# Patient Record
Sex: FEMALE | Race: BLACK OR AFRICAN AMERICAN | NOT HISPANIC OR LATINO | URBAN - METROPOLITAN AREA
[De-identification: names, ages, dates, MRNs, and addresses within clinical notes are randomized per-mention and may not be internally consistent; named-entity substitution may affect disease eponyms.]

---

## 2023-09-28 ENCOUNTER — EMERGENCY (EMERGENCY)
Facility: HOSPITAL | Age: 48
LOS: 1 days | Discharge: HOME | End: 2023-09-28
Admitting: EMERGENCY MEDICINE
Payer: MEDICARE

## 2023-09-28 VITALS
TEMPERATURE: 99 F | DIASTOLIC BLOOD PRESSURE: 95 MMHG | HEART RATE: 80 BPM | SYSTOLIC BLOOD PRESSURE: 159 MMHG | RESPIRATION RATE: 16 BRPM | OXYGEN SATURATION: 99 %

## 2023-09-28 LAB
ALBUMIN SERPL ELPH-MCNC: 4.2 G/DL — SIGNIFICANT CHANGE UP (ref 3.3–5)
ALP SERPL-CCNC: 72 U/L — SIGNIFICANT CHANGE UP (ref 40–120)
ALT FLD-CCNC: 19 U/L — SIGNIFICANT CHANGE UP (ref 4–33)
ANION GAP SERPL CALC-SCNC: 9 MMOL/L — SIGNIFICANT CHANGE UP (ref 7–14)
AST SERPL-CCNC: 15 U/L — SIGNIFICANT CHANGE UP (ref 4–32)
B PERT DNA SPEC QL NAA+PROBE: SIGNIFICANT CHANGE UP
B PERT+PARAPERT DNA PNL SPEC NAA+PROBE: SIGNIFICANT CHANGE UP
BASOPHILS # BLD AUTO: 0.04 K/UL — SIGNIFICANT CHANGE UP (ref 0–0.2)
BASOPHILS NFR BLD AUTO: 0.6 % — SIGNIFICANT CHANGE UP (ref 0–2)
BILIRUB SERPL-MCNC: <0.2 MG/DL — SIGNIFICANT CHANGE UP (ref 0.2–1.2)
BORDETELLA PARAPERTUSSIS (RAPRVP): SIGNIFICANT CHANGE UP
BUN SERPL-MCNC: 13 MG/DL — SIGNIFICANT CHANGE UP (ref 7–23)
C PNEUM DNA SPEC QL NAA+PROBE: SIGNIFICANT CHANGE UP
CALCIUM SERPL-MCNC: 8.7 MG/DL — SIGNIFICANT CHANGE UP (ref 8.4–10.5)
CHLORIDE SERPL-SCNC: 104 MMOL/L — SIGNIFICANT CHANGE UP (ref 98–107)
CO2 SERPL-SCNC: 25 MMOL/L — SIGNIFICANT CHANGE UP (ref 22–31)
CREAT SERPL-MCNC: 0.76 MG/DL — SIGNIFICANT CHANGE UP (ref 0.5–1.3)
EGFR: 97 ML/MIN/1.73M2 — SIGNIFICANT CHANGE UP
EOSINOPHIL # BLD AUTO: 0.54 K/UL — HIGH (ref 0–0.5)
EOSINOPHIL NFR BLD AUTO: 8.5 % — HIGH (ref 0–6)
ERYTHROCYTE [SEDIMENTATION RATE] IN BLOOD: 12 MM/HR — SIGNIFICANT CHANGE UP (ref 4–25)
FLUAV SUBTYP SPEC NAA+PROBE: SIGNIFICANT CHANGE UP
FLUBV RNA SPEC QL NAA+PROBE: SIGNIFICANT CHANGE UP
GLUCOSE SERPL-MCNC: 105 MG/DL — HIGH (ref 70–99)
HADV DNA SPEC QL NAA+PROBE: SIGNIFICANT CHANGE UP
HCG SERPL-ACNC: <1 MIU/ML — SIGNIFICANT CHANGE UP
HCOV 229E RNA SPEC QL NAA+PROBE: SIGNIFICANT CHANGE UP
HCOV HKU1 RNA SPEC QL NAA+PROBE: SIGNIFICANT CHANGE UP
HCOV NL63 RNA SPEC QL NAA+PROBE: SIGNIFICANT CHANGE UP
HCOV OC43 RNA SPEC QL NAA+PROBE: SIGNIFICANT CHANGE UP
HCT VFR BLD CALC: 39.2 % — SIGNIFICANT CHANGE UP (ref 34.5–45)
HGB BLD-MCNC: 12.3 G/DL — SIGNIFICANT CHANGE UP (ref 11.5–15.5)
HMPV RNA SPEC QL NAA+PROBE: SIGNIFICANT CHANGE UP
HPIV1 RNA SPEC QL NAA+PROBE: SIGNIFICANT CHANGE UP
HPIV2 RNA SPEC QL NAA+PROBE: SIGNIFICANT CHANGE UP
HPIV3 RNA SPEC QL NAA+PROBE: SIGNIFICANT CHANGE UP
HPIV4 RNA SPEC QL NAA+PROBE: SIGNIFICANT CHANGE UP
IANC: 2.03 K/UL — SIGNIFICANT CHANGE UP (ref 1.8–7.4)
IMM GRANULOCYTES NFR BLD AUTO: 0.2 % — SIGNIFICANT CHANGE UP (ref 0–0.9)
LYMPHOCYTES # BLD AUTO: 3.35 K/UL — HIGH (ref 1–3.3)
LYMPHOCYTES # BLD AUTO: 52.8 % — HIGH (ref 13–44)
M PNEUMO DNA SPEC QL NAA+PROBE: SIGNIFICANT CHANGE UP
MCHC RBC-ENTMCNC: 25.8 PG — LOW (ref 27–34)
MCHC RBC-ENTMCNC: 31.4 GM/DL — LOW (ref 32–36)
MCV RBC AUTO: 82.4 FL — SIGNIFICANT CHANGE UP (ref 80–100)
MONOCYTES # BLD AUTO: 0.38 K/UL — SIGNIFICANT CHANGE UP (ref 0–0.9)
MONOCYTES NFR BLD AUTO: 6 % — SIGNIFICANT CHANGE UP (ref 2–14)
NEUTROPHILS # BLD AUTO: 2.03 K/UL — SIGNIFICANT CHANGE UP (ref 1.8–7.4)
NEUTROPHILS NFR BLD AUTO: 31.9 % — LOW (ref 43–77)
NRBC # BLD: 0 /100 WBCS — SIGNIFICANT CHANGE UP (ref 0–0)
NRBC # FLD: 0 K/UL — SIGNIFICANT CHANGE UP (ref 0–0)
PLATELET # BLD AUTO: 369 K/UL — SIGNIFICANT CHANGE UP (ref 150–400)
POTASSIUM SERPL-MCNC: 4.3 MMOL/L — SIGNIFICANT CHANGE UP (ref 3.5–5.3)
POTASSIUM SERPL-SCNC: 4.3 MMOL/L — SIGNIFICANT CHANGE UP (ref 3.5–5.3)
PROT SERPL-MCNC: 7.4 G/DL — SIGNIFICANT CHANGE UP (ref 6–8.3)
RAPID RVP RESULT: SIGNIFICANT CHANGE UP
RBC # BLD: 4.76 M/UL — SIGNIFICANT CHANGE UP (ref 3.8–5.2)
RBC # FLD: 12.2 % — SIGNIFICANT CHANGE UP (ref 10.3–14.5)
RSV RNA SPEC QL NAA+PROBE: SIGNIFICANT CHANGE UP
RV+EV RNA SPEC QL NAA+PROBE: SIGNIFICANT CHANGE UP
SARS-COV-2 RNA SPEC QL NAA+PROBE: SIGNIFICANT CHANGE UP
SODIUM SERPL-SCNC: 138 MMOL/L — SIGNIFICANT CHANGE UP (ref 135–145)
WBC # BLD: 6.35 K/UL — SIGNIFICANT CHANGE UP (ref 3.8–10.5)
WBC # FLD AUTO: 6.35 K/UL — SIGNIFICANT CHANGE UP (ref 3.8–10.5)

## 2023-09-28 PROCEDURE — 99285 EMERGENCY DEPT VISIT HI MDM: CPT

## 2023-09-28 RX ORDER — ACETAMINOPHEN 500 MG
1000 TABLET ORAL ONCE
Refills: 0 | Status: COMPLETED | OUTPATIENT
Start: 2023-09-28 | End: 2023-09-28

## 2023-09-28 RX ORDER — SODIUM CHLORIDE 9 MG/ML
1000 INJECTION INTRAMUSCULAR; INTRAVENOUS; SUBCUTANEOUS ONCE
Refills: 0 | Status: COMPLETED | OUTPATIENT
Start: 2023-09-28 | End: 2023-09-28

## 2023-09-28 RX ORDER — METOCLOPRAMIDE HCL 10 MG
10 TABLET ORAL ONCE
Refills: 0 | Status: COMPLETED | OUTPATIENT
Start: 2023-09-28 | End: 2023-09-28

## 2023-09-28 RX ADMIN — Medication 10 MILLIGRAM(S): at 20:31

## 2023-09-28 RX ADMIN — Medication 400 MILLIGRAM(S): at 20:32

## 2023-09-28 RX ADMIN — SODIUM CHLORIDE 1000 MILLILITER(S): 9 INJECTION INTRAMUSCULAR; INTRAVENOUS; SUBCUTANEOUS at 20:32

## 2023-09-28 NOTE — ED PROVIDER NOTE - CLINICAL SUMMARY MEDICAL DECISION MAKING FREE TEXT BOX
48-year-old female with history of hypertension presents complaining of headache x3 days.  Patient states that it is primarily located on the right side of her head and travels towards the side of her head and then back feeling like pressure, throbbing, and like a headband around her head all at the same time.  Patient does not recall what she was doing when it started but states that the headache became gradually worse each day unrelieved with over-the-counter medications.  Associated with neck pain and blurred vision at times.  Denies neck stiffness, fever, chills, cough, nausea, vomiting, chest pain, shortness of breath, weakness, paresthesias.  No recent travel.  Of note, patient has family history of brain aneurysm noting that her mother passed away from 1 that ruptured.  Patient denies tobacco use.  No other voiced complaints.  VSS. Exam as noted above. Will obtain labs (CBC, CMP, ESR, RVP) and CTA Head/Neck to assess for malignancy/infarct though low suspicion given H&P and will medicate with Reglan, IV Tylenol, and IVF. Dispo pending, follow progress notes.

## 2023-09-28 NOTE — ED ADULT NURSE NOTE - NSFALLUNIVINTERV_ED_ALL_ED
Bed/Stretcher in lowest position, wheels locked, appropriate side rails in place/Call bell, personal items and telephone in reach/Instruct patient to call for assistance before getting out of bed/chair/stretcher/Non-slip footwear applied when patient is off stretcher/[place] to call system/Physically safe environment - no spills, clutter or unnecessary equipment/Purposeful proactive rounding/Room/bathroom lighting operational, light cord in reach

## 2023-09-28 NOTE — ED PROVIDER NOTE - PHYSICAL EXAMINATION
CONSTITUTIONAL: Well-appearing; well-nourished; in no apparent distress. Non-toxic appearing.   NEURO: Alert, oriented x 3. Gait steady without assistance. No facial droop. Tongue protrudes midline. Strength to upper and lower extremities 5/5. Finger to nose smooth and accurate b/l.   PSYCH: Mood appropriate. Thought processes intact.   NECK: Supple  CARD: Regular rate and rhythm, no murmurs  RESP: No accessory muscle use; breath sounds clear and equal bilaterally; no wheezes, rhonchi, or rales     ABD: Soft; non-distended; non-tender. No guarding or rebound.   MUSCULOSKELETAL/EXTREMITIES: FROM in all four extremities; no extremity edema.  SKIN: Warm; dry; no apparent lesions or exudate

## 2023-09-28 NOTE — ED PROVIDER NOTE - NSFOLLOWUPINSTRUCTIONS_ED_ALL_ED_FT
Headache    A headache is pain or discomfort felt around the head or neck area. The specific cause of a headache may not be found as there are many types including tension headaches, migraine headaches, and cluster headaches. Watch your condition for any changes. Things you can do to manage your pain include taking over the counter and prescription medications as instructed by your health care provider, lying down in a dark quiet room, limiting stress, getting regular sleep, and refraining from alcohol and tobacco products.    SEEK IMMEDIATE MEDICAL CARE IF YOU HAVE ANY OF THE FOLLOWING SYMPTOMS: fever, vomiting, stiff neck, loss of vision, problems with speech, muscle weakness, loss of balance, trouble walking, passing out, or confusion. Follow up with Neurology as outpatient. You can follow up with Dr. Kalyan Hendricks or Dr. Bola Maloney after discharge. Please call [phone] to schedule an appointment within the next 1-2 weeks. Office is located at [address] [place] [address].    Headache    A headache is pain or discomfort felt around the head or neck area. The specific cause of a headache may not be found as there are many types including tension headaches, migraine headaches, and cluster headaches. Watch your condition for any changes. Things you can do to manage your pain include taking over the counter and prescription medications as instructed by your health care provider, lying down in a dark quiet room, limiting stress, getting regular sleep, and refraining from alcohol and tobacco products.    SEEK IMMEDIATE MEDICAL CARE IF YOU HAVE ANY OF THE FOLLOWING SYMPTOMS: fever, vomiting, stiff neck, loss of vision, problems with speech, muscle weakness, loss of balance, trouble walking, passing out, or confusion.

## 2023-09-28 NOTE — ED ADULT TRIAGE NOTE - CHIEF COMPLAINT QUOTE
pt c/o headache x 3 days. pt denies n/v/fever/chills. endorses sensitivity to light and sound. pain unrelieved with Aleve.  hx- asthma, HTN

## 2023-09-28 NOTE — ED PROVIDER NOTE - CARE PROVIDERS DIRECT ADDRESSES
[email or web address]

## 2023-09-28 NOTE — ED PROVIDER NOTE - DIFFERENTIAL DIAGNOSIS
SAH vs TIA/CVA vs migraine vs tension HA vs temporal arteritis vs viral syndrome Differential Diagnosis

## 2023-09-28 NOTE — ED ADULT NURSE NOTE - ISOLATION TYPE:
Discussed the importance of blood sugar control in the prevention of ocular complications. None 19-Mar-2020 21:00

## 2023-09-28 NOTE — ED PROVIDER NOTE - PATIENT PORTAL LINK FT
You can access the FollowMyHealth Patient Portal offered by [facility] by registering at the following website: [email or web address]. By joining [facility]’s FollowMyHealth portal, you will also be able to view your health information using other applications (apps) compatible with our system.

## 2023-09-28 NOTE — ED ADULT NURSE NOTE - OBJECTIVE STATEMENT
Pt received in intake 12. Pt alert and oriented x 4, ambulatory at baseline. Hx of HTN, asthma.  Pt c/o headache rated 7/10 that began three days ago and progressively became worse. Pt reports sensitivity to light and sound. Pt report blurry vision that began "months ago". Pt reports taking Aleve at ome with no relief. Respirations even and unlabored. Pt denies chest pain, shortness of breath, N/V/D/, dizziness, weakness, fever, chills,  symptoms. 20G IV in the left AC, labs drawn and pt medicated per MD orders.

## 2023-09-28 NOTE — ED PROVIDER NOTE - PROGRESS NOTE DETAILS
CARLA Martinez: Labs reviewed, non-actionable; RVP negative. pt made aware; states her HA is improving but not fully resolved. Awaiting CT scan. CARLA Martinez: CTs reviewed no acute pathology. Pt notes HA has returned; meds ordered and spoke with neurology who will be in to see patient. CARLA Martinez: Pt's HA resolved and patient feeling well. Will d/c to f/u as an outpatient; given strict return precautions. Dayday Gamez MD, PGY2  Pt signed out to me pending neuro recs after discussing with attending. Pt feeling better. Does not want to wait any longer for final neuro recs. Will dc home. Pt will follow up outpatient with neurology. Answered all questions and gave return precautions.

## 2023-09-28 NOTE — ED PROVIDER NOTE - OBJECTIVE STATEMENT
48-year-old female with history of hypertension presents complaining of headache x3 days.  Patient states that it is primarily located on the right side of her head and travels towards the side of her head and then back feeling like pressure, throbbing, and like a headband around her head all at the same time.  Patient does not recall what she was doing when it started but states that the headache became gradually worse each day unrelieved with over-the-counter medications.  Associated with neck pain and blurred vision at times.  Denies neck stiffness, fever, chills, cough, nausea, vomiting, chest pain, shortness of breath, weakness, paresthesias.  No recent travel.  Of note, patient has family history of brain aneurysm noting that her mother passed away from 1 that ruptured.  Patient denies tobacco use.  No other voiced complaints.

## 2023-09-28 NOTE — ED PROVIDER NOTE - CARE PROVIDER_API CALL
Elvira Lowe [place]  Neurology  [address] [place] [id number]  Phone: [phone]  Fax: [phone]  Follow Up Time:

## 2023-09-29 VITALS
OXYGEN SATURATION: 97 % | RESPIRATION RATE: 16 BRPM | HEART RATE: 72 BPM | DIASTOLIC BLOOD PRESSURE: 93 MMHG | SYSTOLIC BLOOD PRESSURE: 151 MMHG | TEMPERATURE: 98 F

## 2023-09-29 PROCEDURE — 70498 CT ANGIOGRAPHY NECK: CPT | Mod: 26,MA

## 2023-09-29 PROCEDURE — 70496 CT ANGIOGRAPHY HEAD: CPT | Mod: 26,MA

## 2023-09-29 RX ORDER — DEXAMETHASONE 0.5 MG/5ML
6 ELIXIR ORAL ONCE
Refills: 0 | Status: COMPLETED | OUTPATIENT
Start: 2023-09-29 | End: 2023-09-29

## 2023-09-29 RX ORDER — MAGNESIUM SULFATE 500 MG/ML
1 VIAL (ML) INJECTION ONCE
Refills: 0 | Status: COMPLETED | OUTPATIENT
Start: 2023-09-29 | End: 2023-09-29

## 2023-09-29 RX ORDER — METOCLOPRAMIDE HCL 10 MG
10 TABLET ORAL ONCE
Refills: 0 | Status: COMPLETED | OUTPATIENT
Start: 2023-09-29 | End: 2023-09-29

## 2023-09-29 RX ORDER — DIPHENHYDRAMINE HCL 50 MG
25 CAPSULE ORAL ONCE
Refills: 0 | Status: COMPLETED | OUTPATIENT
Start: 2023-09-29 | End: 2023-09-29

## 2023-09-29 RX ORDER — KETOROLAC TROMETHAMINE 30 MG/ML
15 SYRINGE (ML) INJECTION ONCE
Refills: 0 | Status: DISCONTINUED | OUTPATIENT
Start: 2023-09-29 | End: 2023-09-29

## 2023-09-29 RX ADMIN — Medication 25 MILLIGRAM(S): at 04:18

## 2023-09-29 RX ADMIN — Medication 10 MILLIGRAM(S): at 04:18

## 2023-09-29 RX ADMIN — Medication 300 GRAM(S): at 04:17

## 2023-09-29 RX ADMIN — Medication 15 MILLIGRAM(S): at 04:17

## 2023-09-29 RX ADMIN — Medication 6 MILLIGRAM(S): at 06:52

## 2023-09-29 NOTE — CONSULT NOTE ADULT - ASSESSMENT
Impression:        Recommendations:    [] Can trial migraine cocktail, please administer together:       1st line -  reglan 10 q6h, toradol 30 iv q8h, magnesium sulfate IV 1g, IV benadryl      2nd line: - solumedrol 250mg IV x1  [] Observe in ED until symptoms improve      Case to be seen and discussed with Neurology attending Dr. Lowe, please await final attending attestation.      TIFFANIE KAPOOR is a 48y (1975) woman with a PMHx significant for HTN who presents after three days of persistent headache.     Impression:  Gradual onset headache in R frontal region, throbbing/pounding quality associated with photophobia and phonophobia, visual floaters, likely primary headache, features consistent with migraine headaches. Less likely 2/2 intracranial mass, VST, or aneurysm     Recommendations:    [] Can trial migraine cocktail, please administer together:       1st line -  reglan 10 q6h, toradol 30 iv q8h, magnesium sulfate IV 1g, IV benadryl      2nd line: - solumedrol 250mg IV x1  [] Observe in ED until symptoms improve  [] Will defer MRI brain w/o for now, can be done outpatient   [] Patient can follow outpatient with Neurology at [address] [place] [address]. Please instruct patient to call [phone] to schedule an appointment.       Case to be seen and discussed with Neurology attending Dr. Lowe, please await final attending attestation.      TIFFANIE KAPOOR is a 48y (1975) woman with a PMHx significant for HTN who presents after three days of persistent headache.     Impression:  Gradual onset headache in R frontal region, throbbing/pounding quality associated with photophobia and phonophobia, visual floaters, likely primary headache, features consistent with migraine headaches. Less likely 2/2 intracranial mass, VST, or aneurysm     Recommendations:    [] Can trial migraine cocktail, please administer together:       1st line -  reglan 10 q6h, toradol 30 iv q8h, magnesium sulfate IV 1g, IV benadryl      2nd line: - solumedrol 250mg IV x1  [] Observe in ED until symptoms improve  [] Will defer MRI brain w/o for now, can be done outpatient   [] Can prescribe 2 week supply of NSAIDs, Reglan, Benadryl   [] Follow up with Neurology as outpatient. Patient can follow up with Dr. Kalyan Hendricks or Dr. Boal Maloney after discharge. Please instruct the patient/family to call [phone] to schedule an appointment within the next 1-2 weeks. Office is located at [address] [place] [address].      Case to be seen and discussed with Neurology attending Dr. Lowe, please await final attending attestation.

## 2023-09-29 NOTE — ED ADULT NURSE REASSESSMENT NOTE - NS ED NURSE REASSESS COMMENT FT1
Pt laying in bed, NAD, NSR on the monitor, respirations even and unlabored. Pt c/o headache rated 9/10, pt medicated per MD orders. Pt denies chest pain, shortness of breath, N/V/D, dizziness, weakness, lightheadedness, numbness, tingling, fever, chills.
Pt laying in bed, NAD, respirations even and unlabored, VS in flow sheet. Pt c/o headache rated 8/10, pt medicated per MD orders. Pt denies chest pain, shortness of breath, N/V/D weakness, fever, chills,  symptoms.
patient AOX4. feels better. denies any pain. d/c by MD. iv removed.

## 2023-09-29 NOTE — CONSULT NOTE ADULT - SUBJECTIVE AND OBJECTIVE BOX
Neurology - Consult Note    -  Spectra: [phone] ([facility]), [phone] ([facility])  -    HPI: Patient TIFFANIE KAPOOR is a 48y (1975) woman with a PMHx significant for     48-year-old female with history of hypertension presents complaining of headache x3 days.  Patient states that it is primarily located on the right side of her head and travels towards the side of her head and then back feeling like pressure, throbbing, and like a headband around her head all at the same time.  Patient does not recall what she was doing when it started but states that the headache became gradually worse each day unrelieved with over-the-counter medications.  Associated with neck pain and blurred vision at times.  Denies neck stiffness, fever, chills, cough, nausea, vomiting, chest pain, shortness of breath, weakness, paresthesias.  No recent travel.  Of note, patient has family history of brain aneurysm noting that her mother passed away from 1 that ruptured.  Patient denies tobacco use.  No other voiced complaints.      Review of Systems:  INCOMPLETE   CONSTITUTIONAL: No fevers or chills  EYES AND ENT: No visual changes or no throat pain   NECK: No pain or stiffness  RESPIRATORY: No hemoptysis or shortness of breath  CARDIOVASCULAR: No chest pain or palpitations  GASTROINTESTINAL: No melena or hematochezia  GENITOURINARY: No dysuria or hematuria  NEUROLOGICAL: +As stated in HPI above  SKIN: No itching, burning, rashes, or lesions   All other review of systems is negative unless indicated above.    Allergies:  No Known Allergies      PMHx/PSHx/Family Hx: As above, otherwise see below   HTN (hypertension)        Social Hx:  No current use of tobacco, alcohol, or illicit drugs  Lives with ***    Medications:  MEDICATIONS  (STANDING):  diphenhydrAMINE Injectable 25 milliGRAM(s) IV Push once  ketorolac   Injectable 15 milliGRAM(s) IV Push Once    MEDICATIONS  (PRN):      Vitals:  T(C): 37.1 (09-29-23 @ 02:20), Max: 37.1 (09-28-23 @ 19:04)  HR: 70 (09-29-23 @ 02:20) (70 - 80)  BP: 145/73 (09-29-23 @ 02:20) (145/73 - 159/95)  RR: 16 (09-29-23 @ 02:20) (16 - 16)  SpO2: 100% (09-29-23 @ 02:20) (99% - 100%)    Physical Examination: INCOMPLETE  General - NAD  Cardiovascular - Peripheral pulses palpable, no edema  Eyes - Fundoscopy with flat, sharp optic discs and no hemorrhage or exudates; Fundoscopy not well visualized; Fundoscopy not performed due to safety precautions in the setting of the COVID-19 pandemic    Neurologic Exam:  Mental status - Awake, Alert, Oriented to person, place, and time. Speech fluent, repetition and naming intact. Follows simple and complex commands. Attention/concentration, recent and remote memory (including registration and recall), and fund of knowledge intact    Cranial nerves - PERRLA, VFF, EOMI, face sensation (V1-V3) intact b/l, facial strength intact without asymmetry b/l, hearing intact b/l, palate with symmetric elevation, trapezius OR sternocleidomastiod 5/5 strength b/l, tongue midline on protrusion with full lateral movement    Motor - Normal bulk and tone throughout. No pronator drift.  Strength testing            Deltoid      Biceps      Triceps     Wrist Extension    Wrist Flexion     Interossei         R            5                 5               5                     5                              5                        5                 5  L             5                 5               5                     5                              5                        5                 5              Hip Flexion    Hip Extension    Knee Flexion    Knee Extension    Dorsiflexion    Plantar Flexion  R              5                           5                       5                           5                            5                          5  L              5                           5                        5                           5                            5                          5    Sensation - Light touch/temperature OR pain/vibration intact throughout    DTR's -             Biceps      Triceps     Brachioradialis      Patellar    Ankle    Toes/plantar response  R             2+             2+                  2+                       2+            2+                 Down  L              2+             2+                 2+                        2+           2+                 Down    Coordination - Finger to Nose intact b/l. No tremors appreciated    Gait and station - Normal casual gait. Romberg (-)    Labs:                        12.3   6.35  )-----------( 369      ( 28 Sep 2023 20:35 )             39.2     09-28    138  |  104  |  13  ----------------------------<  105<H>  4.3   |  25  |  0.76    Ca    8.7      28 Sep 2023 20:35    TPro  7.4  /  Alb  4.2  /  TBili  <0.2  /  DBili  x   /  AST  15  /  ALT  19  /  AlkPhos  72  09-28    CAPILLARY BLOOD GLUCOSE        LIVER FUNCTIONS - ( 28 Sep 2023 20:35 )  Alb: 4.2 g/dL / Pro: 7.4 g/dL / ALK PHOS: 72 U/L / ALT: 19 U/L / AST: 15 U/L / GGT: x               Radiology:      < from: CT Angio Head w/ IV Cont (09.29.23 @ 01:03) >  CT BRAIN:    There is no acute intra-axial or extra-axial hemorrhage. There is no mass   effect or shift of the midline. The basal cisterns are not effaced. The   ventricles are not dilated. Gray-white matter differentiation is   preserved. There is no CT evidence of an acute vascular territorial   infarct. There is no abnormal intracranial enhancement on postcontrast   images.    The regional soft tissues and osseous structures are unremarkable. The   visualized paranasal sinuses and tympanic/mastoid cavities are clear   apart from minimal bilateral ethmoid mucosal thickening.    CT ANGIOGRAM:    There is a three-vessel aortic arch. The common carotid arteries are   patent from the origins to the bifurcations. There is no significant   atherosclerotic disease at the carotid bifurcations. The cervical   internal carotid arteries are patent without stenosis based on NASCET   criteria. The cervical vertebral arteries are patent from the origins to   the skull base. The vertebral arteries are codominant. There is no   evidence of cervical carotid or vertebral artery dissection.    The skull base and intracranial carotid arteries are patent without   significant stenosis. The proximal MCAs and ACAs are patent without   significant stenosis. The anterior communicating artery is visualized.   Distal SHELLIE and MCA branches are grossly symmetric.    The skull base and intradural vertebral arteries and basilar artery are   patent without significant stenosis. The proximal PCAs are patent without   significant stenosis. Bilateral posterior communicating arteries are   visualized. The superior cerebellar artery origins and bilateral   AICA/PICA are identified.    There is no evidence of an intracranial arterial aneurysm or   arteriovenous malformation on CTA.    Intracranial superficial and deep venous sinus system are grossly   unremarkable.    The regional soft tissues of the neck are unremarkable apart from   enlargement of the oropharyngeal and nasopharyngeal lymphoid tissue. The   lung apices are clear. There are mild degenerative changes of the spine.    < end of copied text >   Neurology - Consult Note    -  Spectra: [phone] ([facility]), [phone] ([facility])  -    HPI: Patient TIFFANIE KAPOOR is a 48y (1975) woman with a PMHx significant for HTN who presents after three days of persistent headache. Patient reports gradual onset headache in R frontal region, throbbing/pounding quality. Associated with photophobia and phonophobia. Also endorses visual floaters intermittently. Headache is non-positional does not get worse when lying down or standing up. Rates it 8/10 at present.  Patient tried Aleve at home without significant relief. Patient normally does not get headaches. Denies eye pain or pain with eye movements. Patient sister has history of migraines, and was diagnosed at age 37. Patient endorses significant stress at home recently due to family issues. CTA head and neck was done in ED and unremarkable. Contacted radiology who report cerebral veins and dural sinuses are patent, no evidence of VST.  Patient was given Reglan and Tylenol in ED which improved her headache but it returned after approx 30 minutes. Patient also reports history of poorly controlled BP and says she was recently started on BP meds by her PCP.   Denies neck stiffness, fever, chills, cough, nausea, vomiting, chest pain, shortness of breath, weakness, paresthesias.  No recent travel.  Of note, patient has family history of brain aneurysm noting that her mother passed away from 1 that ruptured.  Patient denies tobacco use or alcohol use or recreational drug use.       Review of Systems:   CONSTITUTIONAL: No fevers or chills  EYES AND ENT: No visual changes or no throat pain   NECK: No pain or stiffness  RESPIRATORY: No hemoptysis or shortness of breath  CARDIOVASCULAR: No chest pain or palpitations  GASTROINTESTINAL: No melena or hematochezia  GENITOURINARY: No dysuria or hematuria  NEUROLOGICAL: +As stated in HPI above  SKIN: No itching, burning, rashes, or lesions   All other review of systems is negative unless indicated above.    Allergies:  No Known Allergies      PMHx/PSHx/Family Hx: As above, otherwise see below   HTN (hypertension)    Social Hx:  No current use of tobacco, alcohol, or illicit drugs    Medications:  MEDICATIONS  (STANDING):  diphenhydrAMINE Injectable 25 milliGRAM(s) IV Push once  ketorolac   Injectable 15 milliGRAM(s) IV Push Once    MEDICATIONS  (PRN):      Vitals:  T(C): 37.1 (09-29-23 @ 02:20), Max: 37.1 (09-28-23 @ 19:04)  HR: 70 (09-29-23 @ 02:20) (70 - 80)  BP: 145/73 (09-29-23 @ 02:20) (145/73 - 159/95)  RR: 16 (09-29-23 @ 02:20) (16 - 16)  SpO2: 100% (09-29-23 @ 02:20) (99% - 100%)    Physical Examination:   General - NAD  Cardiovascular - Peripheral pulses palpable, no edema  Eyes - Fundoscopy not performed due to safety precautions in the setting of the COVID-19 pandemic    Neurologic Exam:  Mental status - Awake, Alert, Oriented to person, place, and time. Speech fluent, repetition and naming intact. Follows simple and complex commands. Attention/concentration, recent and remote memory (including registration and recall), and fund of knowledge intact    Cranial nerves - PERRLA, VFF, EOMI, face sensation (V1-V3) intact b/l, facial strength intact without asymmetry b/l, hearing intact b/l, palate with symmetric elevation, trapezius 5/5 strength b/l, tongue midline on protrusion with full lateral movement    Motor - Normal bulk and tone throughout. No pronator drift.  Strength testing            Deltoid      Biceps      Triceps     Wrist Extension    Wrist Flexion     Interossei         R            5                 5               5                     5                              5                        5                 5  L             5                 5               5                     5                              5                        5                 5              Hip Flexion    Hip Extension    Knee Flexion    Knee Extension    Dorsiflexion    Plantar Flexion  R              5                           5                       5                           5                            5                          5  L              5                           5                        5                           5                            5                          5    Sensation - Light touch intact throughout    DTR's -             Biceps      Triceps     Brachioradialis      Patellar    Ankle    Toes/plantar response  R             2+             2+                  2+                       2+            2+                 Down  L              2+             2+                 2+                        2+           2+                 Down    Coordination - Finger to Nose intact b/l. No tremors appreciated    Gait and station - not assessed     Labs:                        12.3   6.35  )-----------( 369      ( 28 Sep 2023 20:35 )             39.2     09-28    138  |  104  |  13  ----------------------------<  105<H>  4.3   |  25  |  0.76    Ca    8.7      28 Sep 2023 20:35    TPro  7.4  /  Alb  4.2  /  TBili  <0.2  /  DBili  x   /  AST  15  /  ALT  19  /  AlkPhos  72  09-28      LIVER FUNCTIONS - ( 28 Sep 2023 20:35 )  Alb: 4.2 g/dL / Pro: 7.4 g/dL / ALK PHOS: 72 U/L / ALT: 19 U/L / AST: 15 U/L / GGT: x           Radiology:    < from: CT Angio Neck w/ IV Cont (09.29.23 @ 01:03) >  CT BRAIN:    There is no acute intra-axial or extra-axial hemorrhage. There is no mass   effect or shift of the midline. The basal cisterns are not effaced. The   ventricles are not dilated. Gray-white matter differentiation is   preserved. There is no CT evidence of an acute vascular territorial   infarct. There is no abnormal intracranial enhancement on postcontrast   images.    The regional soft tissues and osseous structures are unremarkable. The   visualized paranasal sinuses and tympanic/mastoid cavities are clear   apart from minimal bilateral ethmoid mucosal thickening.    CT ANGIOGRAM:    There is a three-vessel aortic arch. The common carotid arteries are   patent from the origins to the bifurcations. There is no significant   atherosclerotic disease at the carotid bifurcations. The cervical   internal carotid arteries are patent without stenosis based on NASCET   criteria. The cervical vertebral arteries are patent from the origins to   the skull base. The vertebral arteries are codominant. There is no   evidence of cervical carotid or vertebral artery dissection.    The skull base and intracranial carotid arteries are patent without   significant stenosis. The proximal MCAs and ACAs are patent without   significant stenosis. The anterior communicating artery is visualized.   Distal SHELLIE and MCA branches are grossly symmetric.    The skull base and intradural vertebral arteries and basilar artery are   patent without significant stenosis. The proximal PCAs are patent without   significant stenosis. Bilateral posterior communicating arteries are   visualized. The superior cerebellar artery origins and bilateral   AICA/PICA are identified.    There is no evidence of an intracranial arterial aneurysm or   arteriovenous malformation on CTA.    Intracranial superficial and deep venous sinus system are grossly   unremarkable.    The regional soft tissues of the neck are unremarkable apart from   enlargement of the oropharyngeal and nasopharyngeal lymphoid tissue. The   lung apices are clear. There are mild degenerative changes of the spine.      CT Angio Head w/ IV Cont (09.29.23 @ 01:03) >  CT BRAIN:    There is no acute intra-axial or extra-axial hemorrhage. There is no mass   effect or shift of the midline. The basal cisterns are not effaced. The   ventricles are not dilated. Gray-white matter differentiation is   preserved. There is no CT evidence of an acute vascular territorial   infarct. There is no abnormal intracranial enhancement on postcontrast   images.    The regional soft tissues and osseous structures are unremarkable. The   visualized paranasal sinuses and tympanic/mastoid cavities are clear   apart from minimal bilateral ethmoid mucosal thickening.    CT ANGIOGRAM:    There is a three-vessel aortic arch. The common carotid arteries are   patent from the origins to the bifurcations. There is no significant   atherosclerotic disease at the carotid bifurcations. The cervical   internal carotid arteries are patent without stenosis based on NASCET   criteria. The cervical vertebral arteries are patent from the origins to   the skull base. The vertebral arteries are codominant. There is no   evidence of cervical carotid or vertebral artery dissection.    The skull base and intracranial carotid arteries are patent without   significant stenosis. The proximal MCAs and ACAs are patent without   significant stenosis. The anterior communicating artery is visualized.   Distal SHELLIE and MCA branches are grossly symmetric.    The skull base and intradural vertebral arteries and basilar artery are   patent without significant stenosis. The proximal PCAs are patent without   significant stenosis. Bilateral posterior communicating arteries are   visualized. The superior cerebellar artery origins and bilateral   AICA/PICA are identified.    There is no evidence of an intracranial arterial aneurysm or   arteriovenous malformation on CTA.    Intracranial superficial and deep venous sinus system are grossly   unremarkable.    The regional soft tissues of the neck are unremarkable apart from   enlargement of the oropharyngeal and nasopharyngeal lymphoid tissue. The   lung apices are clear. There are mild degenerative changes of the spine.    < end of copied text >